# Patient Record
Sex: MALE | Race: WHITE | NOT HISPANIC OR LATINO
[De-identification: names, ages, dates, MRNs, and addresses within clinical notes are randomized per-mention and may not be internally consistent; named-entity substitution may affect disease eponyms.]

---

## 2020-01-09 ENCOUNTER — APPOINTMENT (OUTPATIENT)
Dept: PHYSICAL MEDICINE AND REHAB | Facility: CLINIC | Age: 55
End: 2020-01-09
Payer: MEDICAID

## 2020-01-09 VITALS
SYSTOLIC BLOOD PRESSURE: 129 MMHG | HEIGHT: 70.5 IN | HEART RATE: 66 BPM | WEIGHT: 273 LBS | BODY MASS INDEX: 38.65 KG/M2 | DIASTOLIC BLOOD PRESSURE: 87 MMHG

## 2020-01-09 DIAGNOSIS — Z85.9 PERSONAL HISTORY OF MALIGNANT NEOPLASM, UNSPECIFIED: ICD-10-CM

## 2020-01-09 PROCEDURE — 99204 OFFICE O/P NEW MOD 45 MIN: CPT

## 2020-01-09 NOTE — DATA REVIEWED
[FreeTextEntry1] : showed me on his tablet tests done -EMG could not be accessed would like to see radial nerve NCV

## 2020-01-09 NOTE — HISTORY OF PRESENT ILLNESS
[FreeTextEntry1] : Mr. RJ ALLISON is a very pleasant 54 year male who comes in for evaluation of left finger weakness  that has been ongoing for  a year  without any specific injury or inciting event. 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 \par he is R handed \par this is devastating to him as he cannot play music and thereby do any work or earn  his living \par \par he had an EMG at [facility] no nerve lesion identified radial nerve ok ? \par MRI forearm also normal \par he needs a neurogram MRI but [facility] cannot do- [facility] will not accept his insurance \par \par surgical exploration being considered which I agree with and sometimes is the only thing that identifies and cures \par

## 2020-01-09 NOTE — PHYSICAL EXAM
[FreeTextEntry1] : PHYSICAL EXAM : OBJECTIVE \par \par GENERAL : Awake ,alert and oriented to time place and person \par HEAD : normocephalic and atraumatic \par NECK : supple ,no tracheal deviation ,no thyroid enlargement noted with swallowing\par EYES : sclera and conjunctiva normal no redness,intact extraocular movements \par ENT  : ears and nose normal in appearance -hearing adequate \par PULMONARY: effort normal. No respiratory distress. breathing regular. No wheezes \par LYMPH : No swelling in limbs, capillary return within normal range \par CVS : warm extremities,no palpitations,not short of breath, no visible jugular venous distention\par PSYCH : mood and affect normal ,good eye contact ,normal attention \par ABDOMEN : no visible distension , \par NEUROLOGICAL:cranial nerves intact,muscle tone normal,gait and balance safe except where noted below \par SKIN : warm and dry No rash detected over specific body areas examined \par MUSCULOSKELETAL: normal muscle bulk, no focal bony tenderness /posture normal except where specified below\par  left wrist 5/5 \par left finger 2-5 all week in extension except 3 and 5 profound \par sensation intact \par grasp ok \par \par No long tract signs found on clinical exam and no focal neurological deficits\par

## 2020-01-09 NOTE — CONSULT LETTER
[FreeTextEntry1] : Dear Dr. TUAN SOUZA  , \par \par I had the pleasure of evaluating your patient, RJ ALLISON .\par \par Thank you very much for allowing me to participate in the care of this patient. If you have any questions, please do not hesitate to contact me. \par \par \par Sincerely, \par Parvez Schwba MD \par \par ABPMR Board Certified in Physical Medicine and Rehabilitation\par Certified Fellow of AANEM (Neuromuscular and Electrodiagnostic Medicine)\par Subspecialty certified in Sports Medicine (ABPMR)\par \par  of Physical Medicine and Rehabilitation\par [facility] [employer] [facility]\par [facility]\par \par

## 2020-01-09 NOTE — REVIEW OF SYSTEMS
[Muscle Weakness] : muscle weakness [Negative] : Heme/Lymph [Fever] : no fever [Chills] : no chills [Joint Pain] : no joint pain [Fatigue] : no fatigue [Joint Stiffness] : no joint stiffness [Muscle Pain] : no muscle pain [FreeTextEntry9] : finger drop left hand ++

## 2020-01-09 NOTE — ASSESSMENT
[FreeTextEntry1] : \par PLAN AND RECOMMENDATIONS :\par \par We discussed differential diagnosis and clinical impression\par I am sure he has a radial nerve motor and partial lesion with wrist spared -more distal clinically \par \par Recommend\par .symptomatic care and support\par \par  imaging  agree with neurogram MRI \par we also discussed where he can get dental care and showers \par he is sleeping in [employer] offices as shelters too dangerous \par  PT had some suboptimal PT after [facility] [address] clinic referred him for carpal tunnel sx ! (another doctor )\par retry  PT  PRE hand \par  relative rest and avoidance of painful activity where possible \par  increasing activity as discussed with PT \par  return for follow up \par will call [facility] regarding organizing this unusual imaging may have to do at [facility]

## 2020-01-24 ENCOUNTER — APPOINTMENT (OUTPATIENT)
Dept: MRI IMAGING | Facility: CLINIC | Age: 55
End: 2020-01-24

## 2020-02-04 ENCOUNTER — FORM ENCOUNTER (OUTPATIENT)
Age: 55
End: 2020-02-04

## 2020-02-05 ENCOUNTER — APPOINTMENT (OUTPATIENT)
Dept: MRI IMAGING | Facility: HOSPITAL | Age: 55
End: 2020-02-05
Payer: MEDICAID

## 2020-02-05 ENCOUNTER — APPOINTMENT (OUTPATIENT)
Dept: MRI IMAGING | Facility: CLINIC | Age: 55
End: 2020-02-05

## 2020-02-05 ENCOUNTER — OUTPATIENT (OUTPATIENT)
Dept: OUTPATIENT SERVICES | Facility: HOSPITAL | Age: 55
LOS: 1 days | End: 2020-02-05
Payer: COMMERCIAL

## 2020-02-05 PROCEDURE — 73219 MRI UPPER EXTREMITY W/DYE: CPT | Mod: 26,LT

## 2020-02-05 PROCEDURE — A9585: CPT

## 2020-02-05 PROCEDURE — 73219 MRI UPPER EXTREMITY W/DYE: CPT

## 2020-02-21 ENCOUNTER — APPOINTMENT (OUTPATIENT)
Dept: PHYSICAL MEDICINE AND REHAB | Facility: CLINIC | Age: 55
End: 2020-02-21
Payer: MEDICAID

## 2020-02-21 VITALS
BODY MASS INDEX: 36.81 KG/M2 | SYSTOLIC BLOOD PRESSURE: 137 MMHG | HEART RATE: 81 BPM | WEIGHT: 260 LBS | HEIGHT: 70.5 IN | DIASTOLIC BLOOD PRESSURE: 81 MMHG

## 2020-02-21 DIAGNOSIS — R29.898 OTHER SYMPTOMS AND SIGNS INVOLVING THE MUSCULOSKELETAL SYSTEM: ICD-10-CM

## 2020-02-21 DIAGNOSIS — G56.12 OTHER LESIONS OF MEDIAN NERVE, LEFT UPPER LIMB: ICD-10-CM

## 2020-02-21 DIAGNOSIS — G58.7 MONONEURITIS MULTIPLEX: ICD-10-CM

## 2020-02-21 PROCEDURE — 95886 MUSC TEST DONE W/N TEST COMP: CPT

## 2020-02-21 PROCEDURE — 95913 NRV CNDJ TEST 13/> STUDIES: CPT

## 2020-02-25 ENCOUNTER — LABORATORY RESULT (OUTPATIENT)
Age: 55
End: 2020-02-25

## 2020-02-25 ENCOUNTER — APPOINTMENT (OUTPATIENT)
Dept: NEUROLOGY | Facility: CLINIC | Age: 55
End: 2020-02-25
Payer: MEDICAID

## 2020-02-25 VITALS
SYSTOLIC BLOOD PRESSURE: 118 MMHG | WEIGHT: 260 LBS | HEART RATE: 81 BPM | OXYGEN SATURATION: 96 % | DIASTOLIC BLOOD PRESSURE: 76 MMHG | HEIGHT: 70.5 IN | BODY MASS INDEX: 36.81 KG/M2

## 2020-02-25 DIAGNOSIS — Z83.3 FAMILY HISTORY OF DIABETES MELLITUS: ICD-10-CM

## 2020-02-25 PROCEDURE — 99203 OFFICE O/P NEW LOW 30 MIN: CPT

## 2020-02-25 NOTE — ASSESSMENT
[FreeTextEntry1] : Assessment afebrile gentleman with a history most consistent with a radial neuropathy this could be part of a brachial plexus neuropathy and we'll proceed at this time with muscle ultrasound MRI of his brachial plexus and blood studies we will send vascular testing this will determine whether his symptoms are isolated radial nerve or whether it is part of a brachial plexopathy of a more systemic nature to account for his symptoms is verbalized understanding to discussion very

## 2020-02-25 NOTE — PHYSICAL EXAM
[FreeTextEntry1] : Examination for peripheral gentleman who has normal mental status cranial nerves are within normal limits fundi benign\par \par His gait is normal lower extremities right upper extremity appear to be within normal limits in his left upper extremity has weakness in radial distribution distal to that of the brachioradialis muscle triceps jerk is borderline reduced the remainder of his reflexes are normal reduced sensation isn't testable in the C7 C8 T1 distribution. Pulses appear to be intact

## 2020-02-25 NOTE — HISTORY OF PRESENT ILLNESS
[FreeTextEntry1] : This is a 54-year-old gentleman with weakness and left arm this occurred spontaneously in 2015 is seen number of physicians he had an EMG study showing radial neuropathy she's describing some minimal discomfort involving his right upper extremity. He feels as though he is little use of his left hand. 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

## 2020-02-26 ENCOUNTER — TRANSCRIPTION ENCOUNTER (OUTPATIENT)
Age: 55
End: 2020-02-26

## 2020-02-26 LAB
B BURGDOR IGG+IGM SER QL IB: NORMAL
CK SERPL-CCNC: 613 U/L
CRP SERPL-MCNC: <0.1 MG/DL
ENA SS-A AB SER IA-ACNC: <0.2 AL
ENA SS-B AB SER IA-ACNC: <0.2 AL
ERYTHROCYTE [SEDIMENTATION RATE] IN BLOOD BY WESTERGREN METHOD: 11 MM/HR
FOLATE SERPL-MCNC: 4.2 NG/ML
RHEUMATOID FACT SER QL: <10 IU/ML
VIT B12 SERPL-MCNC: 407 PG/ML

## 2020-03-03 LAB — CARDIOLIPIN AB SER IA-ACNC: POSITIVE

## 2020-03-08 ENCOUNTER — EMERGENCY (EMERGENCY)
Facility: HOSPITAL | Age: 55
LOS: 1 days | Discharge: HOME | End: 2020-03-08
Attending: EMERGENCY MEDICINE | Admitting: EMERGENCY MEDICINE
Payer: MEDICAID

## 2020-03-08 VITALS
WEIGHT: 263.01 LBS | HEART RATE: 74 BPM | DIASTOLIC BLOOD PRESSURE: 74 MMHG | SYSTOLIC BLOOD PRESSURE: 110 MMHG | OXYGEN SATURATION: 99 % | RESPIRATION RATE: 18 BRPM | TEMPERATURE: 98 F

## 2020-03-08 DIAGNOSIS — S90.811A ABRASION, RIGHT FOOT, INITIAL ENCOUNTER: ICD-10-CM
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
## 2020-03-08 PROCEDURE — 99282 EMERGENCY DEPT VISIT SF MDM: CPT

## 2020-03-09 VITALS
TEMPERATURE: 98 F | DIASTOLIC BLOOD PRESSURE: 75 MMHG | SYSTOLIC BLOOD PRESSURE: 126 MMHG | RESPIRATION RATE: 18 BRPM | OXYGEN SATURATION: 99 % | HEART RATE: 70 BPM

## 2020-03-09 NOTE — ED PROVIDER NOTE - OBJECTIVE STATEMENT
54M w/ PMHx significant for colon cancer (s/p resection 2013) and left forearm neuropathy (following w/ Dr. Mendel) who presents with 2 weeks of pain in his right sole. Patient states that he is currently homeless. No history of recent trauma. Pain has been progressively worsening and he has started to have difficulty bearing weight/walking. Patient denies numbness, tingling, weakness. 54M w/ PMHx significant for colon cancer (s/p resection 2013) and left forearm neuropathy (following w/ Dr. Trujillo) who presents with 2 weeks of pain in his right sole. Patient states that he is currently homeless. No history of recent trauma. Pain has been progressively worsening and he has started to have difficulty bearing weight/walking. Patient denies numbness, tingling, weakness. 54M w/ PMHx significant for colon cancer (s/p resection 2013) and left forearm neuropathy (following w/ Dr. Trujillo) who presents with 2 weeks of pain in his right sole. Patient states that he is currently homeless. No history of recent trauma. Pain has been progressively worsening and he has started to have difficulty bearing weight/walking. Patient denies numbness, tingling, weakness. No bleeding. No other complaints.

## 2020-03-09 NOTE — ED PROVIDER NOTE - CARE PROVIDER_API CALL
Linda Barahona (ANGI)  Orthopaedic Surgery  [address] [place] [address]  Phone: [phone]  Fax: [phone]  Follow Up Time:

## 2020-03-09 NOTE — ED PROVIDER NOTE - NEURO NEGATIVE STATEMENT, MLM
no loss of consciousness, no gait abnormality, no headache, no sensory deficits, and no weakness. no loss of consciousness, no gait abnormality, no headache, no sensory deficits, left forearm weakness

## 2020-03-09 NOTE — ED PROVIDER NOTE - NSFOLLOWUPCLINICS_GEN_ALL_ED_FT
[facility] Pod[facility]ry [facility]  Podiatry  [address] [place] [address]  Phone: [phone]  Fax:   Follow Up Time:

## 2020-03-09 NOTE — ED ADULT NURSE NOTE - OBJECTIVE STATEMENT
55 y/o male received into the ED, axox3, stating that for the past 2 weeks he has been having pain to the right foot and it feels like it is getting worse.  Pt. is able to bear weight.  States that he walks or is standing for the majority of the day.  No deformity or swelling noted to the foot at this time.  Pedal pulse palpated and present.  Capillary refill <2 sec to all toes.  Pt. denies having any injuries to the area.

## 2020-03-09 NOTE — ED PROVIDER NOTE - NSFOLLOWUPINSTRUCTIONS_ED_ALL_ED_FT
Please follow up with your primary care physician and a podiatrist. If you have any problem getting an appointment this week, please call the ED Referral Coordinator at [phone].  Keep legs elevated and monitor your feet daily to check for any worsening of symptoms or signs of infection.   Return to the Emergency Department if you have any new or worsening symptoms, or for any other concerns. Please read below for further information.

## 2020-03-09 NOTE — ED PROVIDER NOTE - PATIENT PORTAL LINK FT
You can access the FollowMyHealth Patient Portal offered by [facility] by registering at the following website: [email or web address]. By joining [employer]’s FollowMyHealth portal, you will also be able to view your health information using other applications (apps) compatible with our system.

## 2020-03-10 ENCOUNTER — OUTPATIENT (OUTPATIENT)
Dept: OUTPATIENT SERVICES | Facility: HOSPITAL | Age: 55
LOS: 1 days | End: 2020-03-10

## 2020-03-10 ENCOUNTER — APPOINTMENT (OUTPATIENT)
Dept: MRI IMAGING | Facility: CLINIC | Age: 55
End: 2020-03-10
Payer: MEDICAID

## 2020-03-10 PROCEDURE — 71550 MRI CHEST W/O DYE: CPT | Mod: 26

## 2020-03-13 ENCOUNTER — APPOINTMENT (OUTPATIENT)
Dept: VASCULAR SURGERY | Facility: CLINIC | Age: 55
End: 2020-03-13
Payer: COMMERCIAL

## 2020-03-13 PROCEDURE — 99203 OFFICE O/P NEW LOW 30 MIN: CPT

## 2020-03-13 NOTE — HISTORY OF PRESENT ILLNESS
[FreeTextEntry1] : 55 yo M was referred by his neurologist, Dr. Trujillo for evaluation of possible TOS. Patient states that his left index finger and pinky are numb and he is unable to lift them. He developed symptoms suddenly in 2018. Denies trauma, history of DVT. He was told that he might have median and radial nerve compression. He had MRI done, but doesn't know the results. He denies pain, edema, discoloration.\par He plays saxophone.

## 2020-03-13 NOTE — PHYSICAL EXAM
[Normal Breath Sounds] : Normal breath sounds [Normal Heart Sounds] : normal heart sounds [Normal Rate and Rhythm] : normal rate and rhythm [2+] : left 2+ [No Rash or Lesion] : No rash or lesion [Alert] : alert [Calm] : calm [JVD] : no jugular venous distention  [de-identified] : WN/WD, NAD [de-identified] : NC/AT [de-identified] : + FROM [de-identified] : grossly intact

## 2020-03-13 NOTE — REVIEW OF SYSTEMS
[As Noted in HPI] : as noted in HPI [Limb Weakness] : limb weakness [Negative] : Heme/Lymph [Limb Pain] : no limb pain [Limb Swelling] : no limb swelling

## 2020-03-13 NOTE — ASSESSMENT
[FreeTextEntry1] : 55 yo M was referred by his neurologist, Dr. Trujillo for evaluation of possible TOS.\par Patient with palpable radia, brachial pulses bilaterally.\par Doubt he has any vascular component, his symptoms are more neurologic.\par He should follow up on MRI results with Dr. Trujillo.\par No vascular issues at this time.

## 2020-03-20 ENCOUNTER — EMERGENCY (EMERGENCY)
Facility: HOSPITAL | Age: 55
LOS: 1 days | Discharge: HOME | End: 2020-03-20
Attending: EMERGENCY MEDICINE | Admitting: EMERGENCY MEDICINE
Payer: MEDICAID

## 2020-03-20 VITALS
HEART RATE: 80 BPM | SYSTOLIC BLOOD PRESSURE: 128 MMHG | RESPIRATION RATE: 18 BRPM | TEMPERATURE: 98 F | DIASTOLIC BLOOD PRESSURE: 76 MMHG | OXYGEN SATURATION: 95 %

## 2020-03-20 VITALS
RESPIRATION RATE: 19 BRPM | TEMPERATURE: 98 F | HEART RATE: 71 BPM | WEIGHT: 259.93 LBS | OXYGEN SATURATION: 98 % | HEIGHT: 70 IN | DIASTOLIC BLOOD PRESSURE: 87 MMHG | SYSTOLIC BLOOD PRESSURE: 132 MMHG

## 2020-03-20 DIAGNOSIS — B34.9 VIRAL INFECTION, UNSPECIFIED: ICD-10-CM

## 2020-03-20 DIAGNOSIS — R68.83 CHILLS (WITHOUT FEVER): ICD-10-CM

## 2020-03-20 LAB — RAPID RVP RESULT: SIGNIFICANT CHANGE UP

## 2020-03-20 PROCEDURE — 71046 X-RAY EXAM CHEST 2 VIEWS: CPT | Mod: 26

## 2020-03-20 PROCEDURE — 87633 RESP VIRUS 12-25 TARGETS: CPT

## 2020-03-20 PROCEDURE — 87635 SARS-COV-2 COVID-19 AMP PRB: CPT

## 2020-03-20 PROCEDURE — 87486 CHLMYD PNEUM DNA AMP PROBE: CPT

## 2020-03-20 PROCEDURE — 71046 X-RAY EXAM CHEST 2 VIEWS: CPT

## 2020-03-20 PROCEDURE — 87798 DETECT AGENT NOS DNA AMP: CPT

## 2020-03-20 PROCEDURE — 99284 EMERGENCY DEPT VISIT MOD MDM: CPT | Mod: 25

## 2020-03-20 PROCEDURE — 87581 M.PNEUMON DNA AMP PROBE: CPT

## 2020-03-20 PROCEDURE — 99283 EMERGENCY DEPT VISIT LOW MDM: CPT

## 2020-03-20 RX ORDER — ACETAMINOPHEN 500 MG
650 TABLET ORAL ONCE
Refills: 0 | Status: COMPLETED | OUTPATIENT
Start: 2020-03-20 | End: 2020-03-20

## 2020-03-20 RX ADMIN — Medication 650 MILLIGRAM(S): at 04:13

## 2020-03-20 RX ADMIN — Medication 650 MILLIGRAM(S): at 03:43

## 2020-03-20 NOTE — ED PROVIDER NOTE - CLINICAL SUMMARY MEDICAL DECISION MAKING FREE TEXT BOX
chills, HA, sneezing, likely viral in origin, no focal neuro deficits, no resp distress  -check rvp/covid  -cxr  -tylenol

## 2020-03-20 NOTE — ED ADULT NURSE NOTE - OBJECTIVE STATEMENT
pt a&ox3 sitting upright in chair. pt reports fatigue, body aches, congestion beginning last night. denies cp, sob, n,v,d,fevers. pt reports being homeless. denies cough. no signs of resp. distress. reports dull head ache.

## 2020-03-20 NOTE — ED PROVIDER NOTE - PATIENT PORTAL LINK FT
You can access the FollowMyHealth Patient Portal offered by [facility] by registering at the following website: [email or web address]. By joining [employer]’s FollowMyHealth portal, you will also be able to view your health information using other applications (apps) compatible with our system.

## 2020-03-20 NOTE — ED PROVIDER NOTE - PROGRESS NOTE DETAILS
no resp distress, speaking in full sentences, tolerating po, advise self-quarantin  I have discussed the discharge plan with the patient. The patient agrees with the plan, as discussed.  The patient understands Emergency Department diagnosis is a preliminary diagnosis often based on limited information and that the patient must adhere to the follow-up plan as discussed.  The patient understands that if the symptoms worsen the patient may return to the Emergency Department at any time for further evaluation and treatment.

## 2020-03-20 NOTE — ED ADULT TRIAGE NOTE - MEANS OF ARRIVAL
History of Present Illness





- General


Chief Complaint: Pain, Acute


Stated Complaint: RIGHT EAR PAIN


Time Seen by Provider: 12/11/17 03:56





- History of Present Illness


Initial Comments: 





12/11/17 03:56


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 

increasing R ear pain x several days in conjucntion with flu symptoms.


Pain had been particularly intense since 4pm.


SHortly before arrival, felt relief of pain and "snap, crackle, pop" noises.


No fever.


Has been taking APAP and motrin for pain.








fhx: noncontrib


ros: reviewed and otherwise negative





o/E


NAD


r ear: serosanguinous fluid pooling, incomplete visualization of TM, erythema 

of canal wall


L ear-contracted T<


no adenopathy











a/p


hx and physical findings most consistent with perfed TM


abx prophxlaxis


ENT fu


12/11/17 04:22








Past History





- Past Medical History


Allergies/Adverse Reactions: 


 Allergies











Allergy/AdvReac Type Severity Reaction Status Date / Time


 


No Known Allergies Allergy   Verified 12/11/17 03:49











Home Medications: 


Ambulatory Orders





Ofloxacin Otic [Floxin Otic -] 2 drop AD BID #50 drops 12/11/17 








COPD: No





- Suicide/Smoking/Psychosocial Hx


Smoking History: Never smoked


Have you smoked in the past 12 months: No


Information on smoking cessation initiated: No


Hx Alcohol Use: No


Drug/Substance Use Hx: No


Substance Use Type: None





*Physical Exam





- Vital Signs


 Last Vital Signs











Temp Pulse Resp BP Pulse Ox


 


 97.7 F   60   16   113/62   100 


 


 12/11/17 03:50  12/11/17 03:50  12/11/17 03:50  12/11/17 03:50  12/11/17 03:50














*DC/Admit/Observation/Transfer


Diagnosis at time of Disposition: 


Perforation of tympanic membrane


Qualifiers:


 Laterality: right Qualified Code(s): H72.91 - Unspecified perforation of 

tympanic membrane, right ear








- Discharge Dispostion


Disposition: HOME


Condition at time of disposition: Stable





- Prescriptions


Prescriptions: 


Ofloxacin Otic [Floxin Otic -] 2 drop AD BID #50 drops





- Referrals





- Patient Instructions


Printed Discharge Instructions:  DI for Tympanic Membrane Perforation-Adult


Additional Instructions: 


Please followup with ENT





- Post Discharge Activity ambulatory

## 2020-03-20 NOTE — ED PROVIDER NOTE - OBJECTIVE STATEMENT
54M no PMH c/o feeling unwell since yesterday.  pt states having chills, headache, sneezing, congestion.  occasional cough, no phlegm.  no n/v/d. no vision change. no numbness/weakness. no recent travel. no sick contacts.  pt states he is currently homeless.

## 2020-03-20 NOTE — ED PROVIDER NOTE - NSFOLLOWUPINSTRUCTIONS_ED_ALL_ED_FT
Viral Syndrome    WHAT YOU NEED TO KNOW:    What is viral syndrome? Viral syndrome is a term used for symptoms of an infection caused by a virus. Viruses are spread easily from person to person through the air and on shared items.     What are the signs and symptoms of viral syndrome? Signs and symptoms may start slowly or suddenly and last hours to days. They can be mild to severe and can change over days or hours. You may have any of the following:     Fever and chills      A runny or stuffy nose       Cough, sore throat, or hoarseness       Headache, or pain and pressure around your eyes       Muscle aches and joint pain       Shortness of breath or wheezing       Abdominal pain, cramps, and diarrhea       Nausea, vomiting, or loss of appetite     How is viral syndrome diagnosed and treated? Your healthcare provider will ask about your symptoms and examine you. Tell him about any recent travel or insect bites. An illness caused by a virus usually goes away in 10 to 14 days without treatment. You may need medicine to help manage your symptoms such as fever, muscle aches, cough, or congestion. Antibiotics are not given for a viral infection.     How can I manage my symptoms?     Drink liquids as directed to prevent dehydration. Ask how much liquid to drink each day and which liquids are best for you. Ask if you should drink an oral rehydration solution (ORS). An ORS has the right amounts of water, salts, and sugar you need to replace body fluids. This may help prevent dehydration caused by vomiting or diarrhea. Do not drink liquids with caffeine. Liquids with caffeine can make dehydration worse.       Get plenty of rest to help your body heal. Take naps throughout the day. Ask your healthcare provider when you can return to work and your normal activities.       Use a cool mist humidifier to help you breathe easier if you have nasal or chest congestion. Ask your healthcare provider how to use a cool mist humidifier.       Eat honey or use cough drops to help decrease throat discomfort. Ask your healthcare provider how much honey you should eat each day. Cough drops are available without a doctor's order. Follow directions for taking cough drops.       Do not smoke and stay away from others who smoke. Nicotine and other chemicals in cigarettes and cigars can cause lung damage. Smoking can also delay healing. Ask your healthcare provider for information if you currently smoke and need help to quit. E-cigarettes or smokeless tobacco still contain nicotine. Talk to your healthcare provider before you use these products.       Wash your hands frequently to prevent the spread of germs to others. Use soap and water. Use gel hand  when soap and water are not available. Wash your hands after you use the bathroom, cough, or sneeze. Wash your hands before you prepare or eat food.     Call 911 or have someone else call 911 if:     You have a seizure.       You cannot be woken.       You have chest pain or trouble breathing.     When should I seek immediate care?     You have a stiff neck, a bad headache, and sensitivity to light.       You feel weak, dizzy, or confused.       You stop urinating or urinate a lot less than normal.       You cough up blood or thick, yellow or green, mucus.       You have severe abdominal pain or your abdomen is larger than usual.     When should I contact my healthcare provider?     Your symptoms do not get better with treatment or get worse after 3 days.       You have a rash or ear pain.       You have burning when you urinate.       You have questions or concerns about your condition or care.    CARE AGREEMENT:    You have the right to help plan your care. Learn about your health condition and how it may be treated. Discuss treatment options with your healthcare providers to decide what care you want to receive. You always have the right to refuse treatment.

## 2020-03-22 LAB — SARS-COV-2 RNA SPEC QL NAA+PROBE: DETECTED

## 2020-04-06 ENCOUNTER — APPOINTMENT (OUTPATIENT)
Dept: NEUROLOGY | Facility: CLINIC | Age: 55
End: 2020-04-06
Payer: COMMERCIAL

## 2020-04-06 PROCEDURE — 99213 OFFICE O/P EST LOW 20 MIN: CPT | Mod: 95

## 2020-04-06 NOTE — HISTORY OF PRESENT ILLNESS
[Home] : at home, [unfilled] , at the time of the visit. [Other Location: e.g. Home (Enter Location, City,State)___] : at [unfilled] [Patient] : the patient [FreeTextEntry1] : 64 year  old with left radial neuropathy- EMG also suggestive of left brachial plexus - \par MRI left BP unremarkable - left Radical neuropathy in the forarm. EMG\par Blood - studies reviewed \par Pt reports no change in neuro symptoms - left UE symptoms \par No symptoms in the RUE- or LE symptoms \par \par Awake and alert - Full EOM\par LUE weakness - radical nerve - 3-/5\par No LE symptoms or RUE \par No tremors \par Negative Romberg \par \par I spoke to Dr Harjit Mendes\par [phone]\par \par Dx Left radical- possible BP\par \par Although BP as a dx - plan would be to operate - on the radial nerve in the arm.\par \par Pt agrees to wait for the ortho surgeon to surgery \par \par \par

## 2020-04-27 PROBLEM — Z78.9 OTHER SPECIFIED HEALTH STATUS: Chronic | Status: ACTIVE | Noted: 2020-03-20

## 2020-04-28 ENCOUNTER — APPOINTMENT (OUTPATIENT)
Dept: NEUROLOGY | Facility: CLINIC | Age: 55
End: 2020-04-28
Payer: MEDICAID

## 2020-04-28 DIAGNOSIS — G56.32 LESION OF RADIAL NERVE, LEFT UPPER LIMB: ICD-10-CM

## 2020-04-28 DIAGNOSIS — R53.1 WEAKNESS: ICD-10-CM

## 2020-04-28 DIAGNOSIS — G54.0 BRACHIAL PLEXUS DISORDERS: ICD-10-CM

## 2020-04-28 PROCEDURE — 99212 OFFICE O/P EST SF 10 MIN: CPT | Mod: 95

## 2020-04-28 NOTE — HISTORY OF PRESENT ILLNESS
[Other Location: e.g. School (Enter Location, City,State)___] : at [unfilled], at the time of the visit. [Patient] : the patient [Medical Office: ([facility])___] : at the medical office located in  [FreeTextEntry1] : 55 year old with LUE weakness - left Brachial plexu  - Left radial nerve \par Scheduled for surgery by Dr MICHELLE [phone]\par \par Weakness in the LUE - RUE and LE normal\par  No chest pain or SOB\par \par Awake and alert- full EOM\par  Weakness LUE predominant Left radial distribution \par  No tremors \par  RUE and LE grossly normal\par  No color temperature or skin changes \par \par Left Brachial plexus - left radial nerve \par Surgery scheduled

## 2020-12-22 NOTE — ED PROVIDER NOTE - CLINICAL SUMMARY MEDICAL DECISION MAKING FREE TEXT BOX
SAFETY PLAN    A suicide Safety Plan is a document that supports someone when they are having thoughts of suicide. Warning Signs that indicate a suicidal crisis may be developing: What (situations, thoughts, feelings, body sensations, behaviors, etc.) do you experience that lets you know you are beginning to think about suicide? 1. Heavy Breathing. 2. Difficulty concentrating. 3. Talking fast, raised voice. Internal Coping Strategies:  What things can I do (relaxation techniques, hobbies, physical activities, etc.) to take my mind off my problems without contacting another person? 1. Watch you tube. 2. Exercise. 3. Hike. People and social settings that provide distraction: Who can I call or where can I go to distract me? 1. Name: Alize Miller Phone: [phone]  2. Name: Efe Marroquin Phone: [phone]  3. Place: [facility]. 4. Place: [facility] 103 whom I can ask for help: Who can I call when I need help - for example, friends, family, clergy, someone else? 1. Name: Alize Miller              Phone: [phone]  2. Name: Lito Gamino Phone: [phone]  3. Name: Efe Marroquin  Phone: [phone]    Professionals or [address] [place] [address] I can contact during a crisis: Who can I call for help - for example, my doctor, my psychiatrist, my psychologist, a mental health provider, a suicide hotline? 1. Clinician Name: [id number] ELMA Duong   Phone: [phone]      Clinician Pager or Emergency Contact #: [phone]    2. Clinician Name: [id number]   Phone: [phone]      Clinician Pager or Emergency Contact #:  [phone]    3. Suicide Prevention Lifeline: 8-741-774-PEKY (1472)    4. [address] [place] [address] Emergency Services -  for example, 174 [facility] suicide hotline, [facility] Hotline: 211      Emergency Services Address: [address]  Matilda [place]. Emergency Services Phone: [phone]    Making the environment safe: How can I make my environment (house/apartment/living space) safer? For example, can I remove guns, medications, and other items? 1. Remove weapons from Home. 2. Remove extra mediations from the house. 54M w/ PMHx significant for colon cancer (s/p resection 2013) and left forearm neuropathy (following w/ Dr. Trujillo) who presents with 2 weeks of pain in his right sole. Exam remarkable for very mild skin breakdown to mid sole of foot, no ulcer, no evidence of infection, NVI distally with +2 pulses, foot is warma nd well-perfused. No indication for XR at this time. Pt advised to monitor feet, elevate, and will give referrals to clinic and podiatry. The patient is stable for DC and is feeling much better.  Symptoms have improved and after discussion regarding discharge, patient feels comfortable going home.  Answers to all questions provided.  Patient feeling satisfactory with care and follow up plan discussed. They were advised to call their PMD for prompt outpatient follow up. Return precautions were discussed. The patient was advised to return to the ER for any concerning or worsening symptoms.

## 2021-12-03 NOTE — ED PROVIDER NOTE - GENITOURINARY NEGATIVE STATEMENT, MLM
no dysuria, no frequency, and no hematuria. PAST MEDICAL HISTORY:  Benign prostate hyperplasia     H/O aortic aneurysm CTA of  chest 11/21 , cardiac eval on chart    Hyperlipidemia     Hypertension     Right cataract

## 2022-02-09 NOTE — ED ADULT NURSE NOTE - [facility] ED NUR SYMPTOMS POS
Left detailed message that BW orders are in the computer and to make sure to fast  PAIN/right foot pain

## 2022-12-20 NOTE — ED ADULT TRIAGE NOTE - BP NONINVASIVE SYSTOLIC (MM HG)
"Keyona ELLISON Trent  2022    S: pt doing well.  Pain well controlled,  Ambulating without difficulty.  Tolerating po intake.  Decreased lochia.  Breast feeding.    O: /62   Pulse 73   Temp 97.7  F (36.5  C) (Axillary)   Resp 16   Ht 1.651 m (5' 5\")   Wt 83.9 kg (185 lb)   LMP 03/10/2022   SpO2 98%   Breastfeeding Unknown   BMI 30.79 kg/m    Recent Labs   Lab 22  2137   HGB 9.1*     Abdomen: soft, non tender, fundus firm below the umbilicus  Ext: non tender, no edema or erythema    A/P: s/p  PPD #1  Doing well  Continue routine post partum care  Discharge planning for tomorrow    Leticia Padilla MD  " 132

## 2023-03-07 NOTE — ED PROVIDER NOTE - ATTESTATION, MLM
met with Pt at bedside to complete MOON. Pt was explained WARD. Pt verbalized understanding of WARD and signed. WARD scanned to .       03/07/23 0952   WARD Message   Medicare Outpatient and Observation Notification regarding financial responsibility Given to patient/caregiver;Explained to patient/caregiver;Signed/date by patient/caregiver   Date WARD was signed 03/07/23   Time WARD was signed 0952        I have reviewed and confirmed nurses' notes for patient's medications, allergies, medical history, and surgical history.